# Patient Record
(demographics unavailable — no encounter records)

---

## 2025-01-15 NOTE — PHYSICAL EXAM
[Left] : left foot and ankle [5___] : plantar flexion 5[unfilled]/5 [2+] : dorsalis pedis pulse: 2+ [] : no achilles tendon tenderness [[id number]] : dorsiflexion 5 degrees [de-identified] : plantar flexion 20 degrees

## 2025-01-15 NOTE — HISTORY OF PRESENT ILLNESS
[de-identified] : 01/15/2025:  injury at cheer yesterday at school practice w/ ankle pain. no tx to date. prior ankle fx in 2021. no prior ankle surgery. denies pmh. 12th grade -- andrzej  [] : no

## 2025-01-15 NOTE — ASSESSMENT
[FreeTextEntry1] : [employer]--has at home ice/elevate nsaids prn PT no gym/cheer reeval 2 wks. 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 will assess ability to return to competition at next visit